# Patient Record
Sex: MALE | Race: WHITE | Employment: FULL TIME | URBAN - METROPOLITAN AREA
[De-identification: names, ages, dates, MRNs, and addresses within clinical notes are randomized per-mention and may not be internally consistent; named-entity substitution may affect disease eponyms.]

---

## 2019-10-30 ENCOUNTER — OFFICE VISIT (OUTPATIENT)
Dept: SLEEP MEDICINE | Facility: CLINIC | Age: 29
End: 2019-10-30
Payer: COMMERCIAL

## 2019-10-30 VITALS
WEIGHT: 223 LBS | HEART RATE: 76 BPM | DIASTOLIC BLOOD PRESSURE: 92 MMHG | SYSTOLIC BLOOD PRESSURE: 136 MMHG | OXYGEN SATURATION: 99 % | BODY MASS INDEX: 27.16 KG/M2 | HEIGHT: 76 IN

## 2019-10-30 DIAGNOSIS — R53.83 MALAISE AND FATIGUE: ICD-10-CM

## 2019-10-30 DIAGNOSIS — R53.81 MALAISE AND FATIGUE: ICD-10-CM

## 2019-10-30 DIAGNOSIS — R06.89 DYSPNEA AND RESPIRATORY ABNORMALITY: Primary | ICD-10-CM

## 2019-10-30 DIAGNOSIS — R06.00 DYSPNEA AND RESPIRATORY ABNORMALITY: Primary | ICD-10-CM

## 2019-10-30 DIAGNOSIS — Z72.820 LACK OF ADEQUATE SLEEP: ICD-10-CM

## 2019-10-30 PROBLEM — F41.9 ANXIETY: Status: ACTIVE | Noted: 2019-10-30

## 2019-10-30 PROCEDURE — 99204 OFFICE O/P NEW MOD 45 MIN: CPT | Performed by: PHYSICIAN ASSISTANT

## 2019-10-30 RX ORDER — ESCITALOPRAM OXALATE 10 MG/1
10 TABLET ORAL DAILY
COMMUNITY

## 2019-10-30 ASSESSMENT — MIFFLIN-ST. JEOR: SCORE: 2068.02

## 2019-10-30 NOTE — NURSING NOTE
"Chief Complaint   Patient presents with     Sleep Problem     consult- Snoring, my wife asked me to do a sleep study       Initial BP (!) 143/92   Pulse 76   Ht 1.93 m (6' 4\")   Wt 101.2 kg (223 lb)   SpO2 99%   BMI 27.14 kg/m   Estimated body mass index is 27.14 kg/m  as calculated from the following:    Height as of this encounter: 1.93 m (6' 4\").    Weight as of this encounter: 101.2 kg (223 lb).    Medication Reconciliation: complete    Neck circumference: 16.5 inches / 42 centimeters.      "

## 2019-10-30 NOTE — PATIENT INSTRUCTIONS
Your BMI is Body mass index is 27.14 kg/m .  Weight management is a personal decision.  If you are interested in exploring weight loss strategies, the following discussion covers the approaches that may be successful. Body mass index (BMI) is one way to tell whether you are at a healthy weight, overweight, or obese. It measures your weight in relation to your height.  A BMI of 18.5 to 24.9 is in the healthy range. A person with a BMI of 25 to 29.9 is considered overweight, and someone with a BMI of 30 or greater is considered obese. More than two-thirds of American adults are considered overweight or obese.  Being overweight or obese increases the risk for further weight gain. Excess weight may lead to heart disease and diabetes.  Creating and following plans for healthy eating and physical activity may help you improve your health.  Weight control is part of healthy lifestyle and includes exercise, emotional health, and healthy eating habits. Careful eating habits lifelong are the mainstay of weight control. Though there are significant health benefits from weight loss, long-term weight loss with diet alone may be very difficult to achieve- studies show long-term success with dietary management in less than 10% of people. Attaining a healthy weight may be especially difficult to achieve in those with severe obesity. In some cases, medications, devices and surgical management might be considered.  What can you do?  If you are overweight or obese and are interested in methods for weight loss, you should discuss this with your provider.     Consider reducing daily calorie intake by 500 calories.     Keep a food journal.     Avoiding skipping meals, consider cutting portions instead.    Diet combined with exercise helps maintain muscle while optimizing fat loss. Strength training is particularly important for building and maintaining muscle mass. Exercise helps reduce stress, increase energy, and improves fitness.  Increasing exercise without diet control, however, may not burn enough calories to loose weight.       Start walking three days a week 10-20 minutes at a time    Work towards walking thirty minutes five days a week     Eventually, increase the speed of your walking for 1-2 minutes at time    In addition, we recommend that you review healthy lifestyles and methods for weight loss available through the National Institutes of Health patient information sites:  http://win.niddk.nih.gov/publications/index.htm    And look into health and wellness programs that may be available through your health insurance provider, employer, local community center, or susy club.    Weight management plan: Patient was referred to their PCP to discuss a diet and exercise plan.

## 2019-10-30 NOTE — PROGRESS NOTES
Sleep Consultation:    Date on this visit: 10/30/2019    Albert Bellamy is sent by No ref. provider found for a sleep consultation regarding.    Chief Complaint   Patient presents with     Sleep Problem     consult- Snoring, my wife asked me to do a sleep study     Albert goes to sleep between 9 and 10:30 AM during the week. He wakes up between 6 and 8 AM without an alarm. He falls asleep in 30 minutes.  Albert denies difficulty falling asleep.  He sleeps through the night.  On weekends, Albert goes to sleep between 9 to 11:30 PM.  He wakes up between 6 and 8 AM without an alarm. He falls asleep in 30 minutes.  Patient gets an average of 8 hours of sleep per night. He is not refreshed by sleep.     Patient does use electronics in bed and read in bed and does not watch TV in bed.     Albert does not do shift work.     Albert does snore every night and snoring is very loud. Patient does not have a regular bed partner due his loud snoring. There is not report of kicking and punching.  He does not have witnessed apneas. They always sleep separately.  Patient sleeps on his back, side and stomach. He denies morning headaches, morning confusion and restless legs. Albert denies any bruxism, sleep walking, dream enactment, sleep paralysis, cataplexy and hypnogogic/hypnopompic hallucinations. He does sleep talk.    Albert denies difficulty breathing through his nose, claustrophobia and reflux at night.      Albert has gained 10 pounds in 5 years.  Patient describes themself as a morning person.  He would prefer to go to sleep at 9:30 PM and wake up at 7:00 AM.  Patient's [place] Sleepiness score 0/24 inconsistent with excessive  daytime sleepiness.  He ropers daytime sleepiness and fatigue.    Albert naps 0 times per week. He takes some inadvertant naps.  He denies falling asleep while driving. Patient was counseled on the importance of driving while alert, to pull over if drowsy, or nap before getting into the vehicle if sleepy.  He uses  1-2 cups/day of coffee. Last caffeine intake is usually before noon.    Allergies:    No Known Allergies    Medications:    Current Outpatient Medications   Medication Sig Dispense Refill     escitalopram (LEXAPRO) 10 MG tablet Take 10 mg by mouth daily         Problem List:  Patient Active Problem List    Diagnosis Date Noted     Anxiety 10/30/2019     Priority: Medium        Past Medical/Surgical History:  No past medical history on file.  No past surgical history on file.    Social History:  Social History     Socioeconomic History     Marital status:      Spouse name: Not on file     Number of children: Not on file     Years of education: Not on file     Highest education level: Not on file   Occupational History     Occupation: Sales   Social Needs     Financial resource strain: Not on file     Food insecurity:     Worry: Not on file     Inability: Not on file     Transportation needs:     Medical: Not on file     Non-medical: Not on file   Tobacco Use     Smoking status: Current Some Day Smoker     Packs/day: 0.00     Types: Cigars     Smokeless tobacco: Never Used   Substance and Sexual Activity     Alcohol use: Yes     Drug use: Never     Sexual activity: Not on file   Lifestyle     Physical activity:     Days per week: Not on file     Minutes per session: Not on file     Stress: Not on file   Relationships     Social connections:     Talks on phone: Not on file     Gets together: Not on file     Attends Restorationism service: Not on file     Active member of club or organization: Not on file     Attends meetings of clubs or organizations: Not on file     Relationship status: Not on file     Intimate partner violence:     Fear of current or ex partner: Not on file     Emotionally abused: Not on file     Physically abused: Not on file     Forced sexual activity: Not on file   Other Topics Concern     Not on file   Social History Narrative     Not on file       Family History:  Family History   Problem  "Relation Age of Onset     Hypertension Mother        Review of Systems:  A complete review of systems reviewed by me is negative with the exeption of what has been mentioned in the history of present illness.  CONSTITUTIONAL: NEGATIVE for weight gain/loss, fever, chills, sweats or night sweats, drug allergies.  EYES: NEGATIVE for changes in vision, blind spots, double vision.  ENT: NEGATIVE for ear pain, sore throat, sinus pain, post-nasal drip, runny nose, bloody nose  CARDIAC: NEGATIVE for fast heartbeats or fluttering in chest, chest pain or pressure, breathlessness when lying flat, swollen legs or swollen feet.  NEUROLOGIC: NEGATIVE headaches, weakness or numbness in the arms or legs.  DERMATOLOGIC: NEGATIVE for rashes, new moles or change in mole(s)  PULMONARY: NEGATIVE SOB at rest, SOB with activity, dry cough, productive cough, coughing up blood, wheezing or whistling when breathing.    GASTROINTESTINAL: NEGATIVE for nausea or vomitting, loose or watery stools, fat or grease in stools, constipation, abdominal pain, bowel movements black in color or blood noted.  GENITOURINARY: NEGATIVE for pain during urination, blood in urine, urinating more frequently than usual, irregular menstrual periods.  MUSCULOSKELETAL: NEGATIVE for muscle pain, bone or joint pain, swollen joints.  ENDOCRINE: NEGATIVE for increased thirst or urination, diabetes.  LYMPHATIC: NEGATIVE for swollen lymph nodes, lumps or bumps in the breasts or nipple discharge.    Physical Examination:  Vitals: BP (!) 136/92   Pulse 76   Ht 1.93 m (6' 4\")   Wt 101.2 kg (223 lb)   SpO2 99%   BMI 27.14 kg/m    BMI= Body mass index is 27.14 kg/m .    Neck Cir (cm): 42 cm    [place] Total Score 10/30/2019   Total score - [place] 0       [place] Total Score: 5 (10/30/19 1300)    GENERAL APPEARANCE: alert and no distress  EYES: Eyes grossly normal to inspection, PERRL and conjunctivae and sclerae normal  HENT: ear canals and TM's normal, nose and mouth without " ulcers or lesions, oropharynx crowded and tonsillar hypertrophy  NECK: no adenopathy and no asymmetry, masses, or scars  RESP: lungs clear to auscultation - no rales, rhonchi or wheezes  CV: regular rates and rhythm, normal S1 S2, no S3 or S4 and no murmur, click or rub  MS: extremities normal- no gross deformities noted  NEURO: Normal strength and tone, mentation intact and speech normal  PSYCH: mentation appears normal and affect normal/bright  Mallampati Class: III.  Tonsillar Stage: 3  extending beyond pillars.    Last Comprehensive Metabolic Panel:  No results found for: NA, POTASSIUM, CHLORIDE, CO2, ANIONGAP, GLC, BUN, CR, GFRESTIMATED, FATUMA   No results found for: TSH    Impression:  Patient has features and risk factors for possible obstructive sleep apnea including: loud snoring,  non-refreshing sleep, daytime fatigue/sleepiness, difficulty maintaining sleep, crowded oropharynx. The STOP-BANG score is 3/8. We discussed the pros and cons of Home Sleep Test versus an attended polysomnogram.     Plan:    1. Schedule a Home Sleep Apnea Testing to evaluate for obstructive sleep apnea.    2. Advised him against drowsy driving.    3. Recommend weight management.     Literature provided regarding sleep apnea.      He will follow up with me in approximately two weeks after his sleep study has been competed to review the results and discuss plan of care.       Home Sleep Apnea Testingreviewed.  Obstructive sleep apnea reviewed.  Complications of untreated sleep apnea were reviewed.    Pebbles Zhu PA-C    CC: No ref. provider found

## 2019-11-18 ENCOUNTER — OFFICE VISIT (OUTPATIENT)
Dept: SLEEP MEDICINE | Facility: CLINIC | Age: 29
End: 2019-11-18
Payer: COMMERCIAL

## 2019-11-18 DIAGNOSIS — R53.81 MALAISE AND FATIGUE: ICD-10-CM

## 2019-11-18 DIAGNOSIS — G47.33 OSA (OBSTRUCTIVE SLEEP APNEA): Primary | ICD-10-CM

## 2019-11-18 DIAGNOSIS — R06.89 DYSPNEA AND RESPIRATORY ABNORMALITY: ICD-10-CM

## 2019-11-18 DIAGNOSIS — Z72.820 LACK OF ADEQUATE SLEEP: ICD-10-CM

## 2019-11-18 DIAGNOSIS — R06.00 DYSPNEA AND RESPIRATORY ABNORMALITY: ICD-10-CM

## 2019-11-18 DIAGNOSIS — R53.83 MALAISE AND FATIGUE: ICD-10-CM

## 2019-11-18 PROCEDURE — G0399 HOME SLEEP TEST/TYPE 3 PORTA: HCPCS | Performed by: INTERNAL MEDICINE

## 2019-11-19 ENCOUNTER — APPOINTMENT (OUTPATIENT)
Dept: SLEEP MEDICINE | Facility: CLINIC | Age: 29
End: 2019-11-19
Payer: COMMERCIAL

## 2019-11-19 NOTE — PROGRESS NOTES
Pt returned HST device. It was downloaded and forwarded data to the clinical specialist for scoring.    Loni Guillermo MA on 11/19/2019 at 8:51 AM

## 2019-11-19 NOTE — PROGRESS NOTES
This HSAT was performed using a Noxturnal T3 device which recorded snore, sound, movement activity, body position, nasal pressure, oronasal thermal airflow, pulse, oximetry and both chest and abdominal respiratory effort. HSAT data was restricted to the time patient states they were in bed.     HSAT was scored using 1B 4% hypopnea rule.     HST AHI (Non-PAT): 39.1  Snoring was reported as mild.  Time with SpO2 below 89% was 38.7 minutes.   Overall signal quality was good     Pt will follow up with sleep provider to determine appropriate therapy.

## 2019-11-20 PROBLEM — F41.9 ANXIETY: Chronic | Status: ACTIVE | Noted: 2019-10-30

## 2019-11-20 NOTE — PROCEDURES
"HOME SLEEP STUDY INTERPRETATION    Patient: Albert Bellamy  MRN: [id number]  YOB: 1990  Study Date: 11/18/2019  Referring Provider: No primary care provider on file  Ordering Provider: SANTIAGO Escobar     Indications for Home Study: Albert Bellamy is a 29 year old male with symptoms suggestive of obstructive sleep apnea.    Estimated body mass index is 27.14 kg/m  as calculated from the following:    Height as of 10/30/19: 1.93 m (6' 4\").    Weight as of 10/30/19: 101.2 kg (223 lb).  Total score - [place]: 0 (10/30/2019  1:00 PM)  [place]Bang Total Score: 3 (10/30/2019  1:42 PM)    Data: A full night home sleep study was performed recording the standard physiologic parameters including body position, movement, sound, nasal pressure, thermal oral airflow, chest and abdominal movements with respiratory inductance plethysmography, and oxygen saturation by pulse oximetry. Pulse rate was estimated by oximetry recording. This study was considered adequate based on > 4 hours of quality oximetry and respiratory recording. As specified by the AASM Manual for the Scoring of Sleep and Associated events, version 2.3, Rule VIII.D 1B, 4% oxygen desaturation scoring for hypopneas is used as a standard of care on all home sleep apnea testing.    Analysis Time:  460 minutes    Respiration:   Sleep Associated Hypoxemia: episodic hypoxemia was present. Baseline oxygen saturation was 94%.  Time with saturation less than or equal to 88% was 38 minutes. The lowest oxygen saturation was 80%.   Snoring: Snoring was present.  Respiratory events: The home study revealed a presence of 180 obstructive apneas and 11 mixed and central apneas. There were 109 hypopneas resulting in a combined apnea/hypopnea index [AHI] of 39.1 events per hour.  AHI was 51.1 per hour supine, n/a per hour prone, n/a per hour on left side, and 10.4 per hour on right side.   Pattern: Excluding events noted above, respiratory rate and pattern was " Normal.    Position: Percent of time spent: supine - 71%, prone - 0%, on left - 0%, on right - 29%.    Heart Rate: By pulse oximetry normal rate was noted.     Assessment:   Severe obstructive sleep apnea, supine positional worsening  Sleep associated hypoxemia was present.    Recommendations:  Consider auto-CPAP at 5-18 cmH2O, oral appliance therap or positional therapy with follow-up HST, or polysomnography with full night PAP titration or finally surgical options.  Suggest optimizing sleep hygiene and avoiding sleep deprivation.  Weight management.    Diagnosis Code(s): Obstructive Sleep Apnea G47.33, Hypoxemia G47.36    Tristan Solitario MD, November 20, 2019   Diplomate, American Board of Internal Medicine, Sleep Medicine

## 2019-12-02 ENCOUNTER — OFFICE VISIT (OUTPATIENT)
Dept: SLEEP MEDICINE | Facility: CLINIC | Age: 29
End: 2019-12-02
Payer: COMMERCIAL

## 2019-12-02 VITALS
OXYGEN SATURATION: 100 % | SYSTOLIC BLOOD PRESSURE: 123 MMHG | HEART RATE: 88 BPM | HEIGHT: 76 IN | WEIGHT: 223 LBS | BODY MASS INDEX: 27.16 KG/M2 | DIASTOLIC BLOOD PRESSURE: 90 MMHG

## 2019-12-02 DIAGNOSIS — G47.33 OSA (OBSTRUCTIVE SLEEP APNEA): Primary | ICD-10-CM

## 2019-12-02 PROCEDURE — 99213 OFFICE O/P EST LOW 20 MIN: CPT | Performed by: PHYSICIAN ASSISTANT

## 2019-12-02 ASSESSMENT — MIFFLIN-ST. JEOR: SCORE: 2068.02

## 2019-12-02 NOTE — NURSING NOTE
"Chief Complaint   Patient presents with     Study Results       Initial BP (!) 134/92   Pulse 88   Ht 1.93 m (6' 4\")   Wt 101.2 kg (223 lb)   SpO2 100%   BMI 27.14 kg/m   Estimated body mass index is 27.14 kg/m  as calculated from the following:    Height as of this encounter: 1.93 m (6' 4\").    Weight as of this encounter: 101.2 kg (223 lb).    Medication Reconciliation: complete      "

## 2019-12-02 NOTE — PROGRESS NOTES
"Home Sleep Apnea Testing Results Visit:    Chief Complaint   Patient presents with     Study Results       Albert Bellamy is a 29 year old male who returns to [facility] after having had Home Sleep Apnea Testing.  He presented with loud snoring,  non-refreshing sleep, daytime fatigue/sleepiness, difficulty maintaining sleep, crowded oropharynx. The STOP-BANG score is 3/8.    Home Sleep Apnea Testing - 11/18/19: 223 lbs 0 oz: AHI 39.1/hr. Supine AHI 51.1/hr.   Oxygen Andi of 80%.  Baseline 93.7%.  Sp02 =< 88% for 38.7 minutes  He slept on his back (70.5%), prone (0.0%), left (0.0) and right (28.8%) sides.   Analysis time: 460.8 minutes.     Signal quality of Oxymeter 99% Good  Nasal Cannula 100% Good  RIP belts 100% Good.     Albert Bellamy reports that he slept Poor.     Home Sleep Apnea Testing was reviewed in detail today with Albert and a copy given to him for his records.    Past medical/surgical history, family history, social history, medications and allergies were reviewed.      BP (!) 123/90   Pulse 88   Ht 1.93 m (6' 4\")   Wt 101.2 kg (223 lb)   SpO2 100%   BMI 27.14 kg/m      Impression/Plan:  Severe Obstructive Sleep Apnea with supine positional worsening.  Sleep associated hypoxemia was present.      Treatment options discussed today including  auto-CPAP at 6-15 cmH2O, oral appliance therapy, positional therapy or polysomnography with full night PAP titration.    Elected treatment with auto-CPAP at 6-15 cmH2O.  Albert to follow up with Primary Care provider regarding elevated blood pressure.    20 minutes spent with patient with >50% spent in counseling and coordination of care.      Pebbles Zhu PA-C      "

## 2019-12-04 ENCOUNTER — TELEPHONE (OUTPATIENT)
Dept: SLEEP MEDICINE | Facility: CLINIC | Age: 29
End: 2019-12-04

## 2019-12-05 ENCOUNTER — TELEPHONE (OUTPATIENT)
Dept: SLEEP MEDICINE | Facility: CLINIC | Age: 29
End: 2019-12-05

## 2019-12-05 NOTE — TELEPHONE ENCOUNTER
Called pt to schedule appt for CPAP setup. Pt asked to go to [place] location. Scheduled for 12/12/19 at 9:30am. Provided address and location information ([address]).

## 2019-12-12 ENCOUNTER — DOCUMENTATION ONLY (OUTPATIENT)
Dept: SLEEP MEDICINE | Facility: CLINIC | Age: 29
End: 2019-12-12

## 2019-12-12 NOTE — PROGRESS NOTES
Patient was offered choice of vendor and chose [facility].  Patient Albert Bellamy was set up at [facility] on December 12, 2019. Patient received a [employer] DreamStation Auto. Pressures were set at 6-15 cm H2O.   Patient s ramp is 5 cm H2O for Auto and FLEX/EPR is A Flex.  Patient received a Resmed Mask name: [id number]  Nasal mask size Small with medium cushion, heated tubing and heated humidifier.  Patient is enrolled in the STM Program and does need to meet compliance. Patient has a follow up on TBD    Leanne Mcnally

## 2019-12-16 ENCOUNTER — DOCUMENTATION ONLY (OUTPATIENT)
Dept: SLEEP MEDICINE | Facility: CLINIC | Age: 29
End: 2019-12-16
Payer: COMMERCIAL

## 2019-12-16 NOTE — PROGRESS NOTES
3 DAY STM VISIT    Diagnostic AHI:  39.1 HST    Patient contacted for 3 day STM visit.  Message left for patient to return call.    Replacement device: No    STM ordered by provider: Yes     Device type: Auto-CPAP  PAP settings from order::  CPAP min 6 cm  H20       CPAP max 15 cm  H20  Mask type:    Nasal Mask     Device settings from machine        Assessment: Nightly usage over four hours.  Action plan: Patient to have 14 day STM visit. Patient has a follow up visit scheduled:   no.    Total time spent on accessing, reviewing and interpreting remote patient PAP therapy data:   11 minutes      Total time spent with direct patient communication :   1 minutes

## 2019-12-27 ENCOUNTER — DOCUMENTATION ONLY (OUTPATIENT)
Dept: SLEEP MEDICINE | Facility: CLINIC | Age: 29
End: 2019-12-27

## 2019-12-27 NOTE — PROGRESS NOTES
14  DAY STM VISIT    Diagnostic AHI:   39.1 HST    Data only recheck unable to leave voicemail.  Data was reviewed in [facility] and a new order was placed to connect [facility]     Assessment: Pt meeting objective benchmarks.       Action plan: pt to have 30 day STM visit.      Device type: Auto-CPAP    PAP settings: CPAP min 6 cm  H20       CPAP max 15 cm  H20         Mask type:  Nasal Mask    Objective measures: 14 day rolling measures      Compliance  92.9 %      Leak  0% in large leak         AHI 2.5    last  upload         Objective measure goal  Compliance   Goal >70%  Leak   Goal < 24 lpm  AHI  Goal < 5  Usage  Goal >240      Total time spent on accessing, reviewing and interpreting remote patient PAP therapy data:   15 minutes      Total time spent with direct patient communication :   1 minutes

## 2020-01-13 ENCOUNTER — DOCUMENTATION ONLY (OUTPATIENT)
Dept: SLEEP MEDICINE | Facility: CLINIC | Age: 30
End: 2020-01-13

## 2020-01-13 NOTE — PROGRESS NOTES
30 DAY STM VISIT    Diagnostic AHI:   39.1  HST      Message left for patient to return call     Assessment: Pt meeting objective benchmarks.     Action plan: waiting for patient to return call.   Patient has not scheduled a follow up visit with Pebbles Zhu PA-C  It is required by his insurance.     Device type: Auto-CPAP  PAP settings: CPAP min 6 cm  H20     CPAP max 15 cm  H20         90th % [id number] cm  H20    Mask type:  Nasal Mask    Objective measures: 14 day rolling measures         Compliance  92 %     % of night spent in large leak  0 % last  upload      AHI 2.53   last  upload      Average number of minutes 430          Objective measure goal  Compliance   Goal >70%  Leak   Goal < 10%  AHI  Goal < 5  Usage  Goal >240      Total time spent on accessing, reviewing and interpreting remote patient PAP therapy data:   10 minutes

## 2020-02-27 ENCOUNTER — OFFICE VISIT (OUTPATIENT)
Dept: SLEEP MEDICINE | Facility: CLINIC | Age: 30
End: 2020-02-27
Payer: COMMERCIAL

## 2020-02-27 VITALS
OXYGEN SATURATION: 97 % | HEART RATE: 80 BPM | BODY MASS INDEX: 26.79 KG/M2 | HEIGHT: 76 IN | DIASTOLIC BLOOD PRESSURE: 80 MMHG | SYSTOLIC BLOOD PRESSURE: 127 MMHG | WEIGHT: 220 LBS

## 2020-02-27 DIAGNOSIS — G47.33 OSA (OBSTRUCTIVE SLEEP APNEA): Primary | ICD-10-CM

## 2020-02-27 PROCEDURE — 99213 OFFICE O/P EST LOW 20 MIN: CPT | Performed by: PHYSICIAN ASSISTANT

## 2020-02-27 ASSESSMENT — MIFFLIN-ST. JEOR: SCORE: 2054.41

## 2020-02-27 NOTE — PATIENT INSTRUCTIONS
Your BMI is Body mass index is 26.78 kg/m .  Weight management is a personal decision.  If you are interested in exploring weight loss strategies, the following discussion covers the approaches that may be successful. Body mass index (BMI) is one way to tell whether you are at a healthy weight, overweight, or obese. It measures your weight in relation to your height.  A BMI of 18.5 to 24.9 is in the healthy range. A person with a BMI of 25 to 29.9 is considered overweight, and someone with a BMI of 30 or greater is considered obese. More than two-thirds of American adults are considered overweight or obese.  Being overweight or obese increases the risk for further weight gain. Excess weight may lead to heart disease and diabetes.  Creating and following plans for healthy eating and physical activity may help you improve your health.  Weight control is part of healthy lifestyle and includes exercise, emotional health, and healthy eating habits. Careful eating habits lifelong are the mainstay of weight control. Though there are significant health benefits from weight loss, long-term weight loss with diet alone may be very difficult to achieve- studies show long-term success with dietary management in less than 10% of people. Attaining a healthy weight may be especially difficult to achieve in those with severe obesity. In some cases, medications, devices and surgical management might be considered.  What can you do?  If you are overweight or obese and are interested in methods for weight loss, you should discuss this with your provider.     Consider reducing daily calorie intake by 500 calories.     Keep a food journal.     Avoiding skipping meals, consider cutting portions instead.    Diet combined with exercise helps maintain muscle while optimizing fat loss. Strength training is particularly important for building and maintaining muscle mass. Exercise helps reduce stress, increase energy, and improves fitness.  Increasing exercise without diet control, however, may not burn enough calories to loose weight.       Start walking three days a week 10-20 minutes at a time    Work towards walking thirty minutes five days a week     Eventually, increase the speed of your walking for 1-2 minutes at time    In addition, we recommend that you review healthy lifestyles and methods for weight loss available through the National Institutes of Health patient information sites:  http://win.niddk.nih.gov/publications/index.htm    And look into health and wellness programs that may be available through your health insurance provider, employer, local community center, or susy club.    Weight management plan: Patient was referred to their PCP to discuss a diet and exercise plan.

## 2020-02-27 NOTE — NURSING NOTE
"Chief Complaint   Patient presents with     CPAP Follow Up       Initial /80   Pulse 80   Ht 1.93 m (6' 4\")   Wt 99.8 kg (220 lb)   SpO2 97%   BMI 26.78 kg/m   Estimated body mass index is 26.78 kg/m  as calculated from the following:    Height as of this encounter: 1.93 m (6' 4\").    Weight as of this encounter: 99.8 kg (220 lb).    Medication Reconciliation: complete      "

## 2020-02-27 NOTE — PROGRESS NOTES
"Obstructive Sleep Apnea - PAP Follow-Up Visit:    Chief Complaint   Patient presents with     CPAP Follow Up       Albert Bellamy comes in today for follow-up of their severe sleep apnea, managed with CPAP.     He presented with loud snoring,  non-refreshing sleep, daytime fatigue/sleepiness, difficulty maintaining sleep, crowded oropharynx. The STOP-BANG score is 3/8.     Home Sleep Apnea Testing - 11/18/19: 223 lbs 0 oz: AHI 39.1/hr. Supine AHI 51.1/hr.   Oxygen Andi of 80%.  Baseline 93.7%.  Sp02 =< 88% for 38.7 minutes    Overall, he rates the experience with PAP as 9 (0 poor, 10 great). The mask is comfortable.    The mask is not leaking .  He is not snoring with the mask on. He is not having gasp arousals.  He is not having significant oral/nasal dryness. The pressure is comfortable.     His PAP interface is Nasal Pillows.    Bedtime is typically 2200. Usually it takes about 15 min minutes to fall asleep with the mask on. Wake time is typically 0630.  Patient is using PAP therapy 7 hours per night. The patient is usually getting 7 hours of sleep per night.    He does feel rested in the morning.    Total score - [place]: 0 (2/27/2020  1:00 PM)    Respironics  Auto-PAP 6 - 15 cmH2O 30 day usage data:    86% of days with > 4 hours of use. 0/30 days with no use.   Average use 408 minutes per day.   Average leak 31.89 LPM.  Average % of night in large leak 0%.    CPAP 90% pressure 8.7cm.   AHI 1.85 events per hour.        Past medical/surgical history, family history, social history, medications and allergies were reviewed.      Problem List:  Patient Active Problem List    Diagnosis Date Noted     [place] (obstructive sleep apnea) 02/27/2020     Priority: Medium     Home Sleep Apnea Testing - 11/18/19: 223 lbs 0 oz: AHI 39.1/hr. Supine AHI 51.1/hr. Oxygen Andi of 80%.  Baseline 93.7%.  Sp02 =< 88% for 38.7 minutes.       Anxiety 10/30/2019     Priority: Medium        /80   Pulse 80   Ht 1.93 m (6' 4\")   Wt " 99.8 kg (220 lb)   SpO2 97%   BMI 26.78 kg/m      Impression/Plan:    Severe Sleep apnea.  Tolerating PAP well. Daytime symptoms are improved.   Continue current treatment.  Comprehensive DME    Albert Bellamy will follow up in about 1 year or sooner if any concerns.     Pebbles Zhu PA-C

## 2020-02-27 NOTE — NURSING NOTE
".  Chief Complaint   Patient presents with     CPAP Follow Up       Initial /80   Pulse 80   Ht 1.93 m (6' 4\")   Wt 99.8 kg (220 lb)   SpO2 97%   BMI 26.78 kg/m   Estimated body mass index is 26.78 kg/m  as calculated from the following:    Height as of this encounter: 1.93 m (6' 4\").    Weight as of this encounter: 99.8 kg (220 lb).    Medication Reconciliation: complete      "

## 2020-03-11 ENCOUNTER — HEALTH MAINTENANCE LETTER (OUTPATIENT)
Age: 30
End: 2020-03-11

## 2021-01-03 ENCOUNTER — HEALTH MAINTENANCE LETTER (OUTPATIENT)
Age: 31
End: 2021-01-03

## 2021-04-25 ENCOUNTER — HEALTH MAINTENANCE LETTER (OUTPATIENT)
Age: 31
End: 2021-04-25

## 2021-10-10 ENCOUNTER — HEALTH MAINTENANCE LETTER (OUTPATIENT)
Age: 31
End: 2021-10-10

## 2021-11-18 DIAGNOSIS — G47.33 OBSTRUCTIVE SLEEP APNEA (ADULT) (PEDIATRIC): Primary | ICD-10-CM

## 2022-05-22 ENCOUNTER — HEALTH MAINTENANCE LETTER (OUTPATIENT)
Age: 32
End: 2022-05-22

## 2022-09-18 ENCOUNTER — HEALTH MAINTENANCE LETTER (OUTPATIENT)
Age: 32
End: 2022-09-18

## 2023-06-04 ENCOUNTER — HEALTH MAINTENANCE LETTER (OUTPATIENT)
Age: 33
End: 2023-06-04